# Patient Record
(demographics unavailable — no encounter records)

---

## 2025-01-10 NOTE — PHYSICAL EXAM
[No Acute Distress] : no acute distress [Well Nourished] : well nourished [Normal Sclera/Conjunctiva] : normal sclera/conjunctiva [Normal Outer Ear/Nose] : the outer ears and nose were normal in appearance [Normal Oropharynx] : the oropharynx was normal [No JVD] : no jugular venous distention [Supple] : supple [No Respiratory Distress] : no respiratory distress  [No Accessory Muscle Use] : no accessory muscle use [Normal Rate] : normal rate  [Regular Rhythm] : with a regular rhythm [Normal S1, S2] : normal S1 and S2 [No Varicosities] : no varicosities [No Edema] : there was no peripheral edema [Soft] : abdomen soft [Non Tender] : non-tender [No HSM] : no HSM [Normal Supraclavicular Nodes] : no supraclavicular lymphadenopathy [Normal Anterior Cervical Nodes] : no anterior cervical lymphadenopathy [No CVA Tenderness] : no CVA  tenderness [No Joint Swelling] : no joint swelling [No Rash] : no rash [Speech Grossly Normal] : speech grossly normal [Normal Mood] : the mood was normal [Normal Insight/Judgement] : insight and judgment were intact

## 2025-01-10 NOTE — HISTORY OF PRESENT ILLNESS
[FreeTextEntry1] : Follow up annual visit [de-identified] : 62Y/M with no significant PMH, presented for annual follow up. Denied active complaints. Requesting pre-employment physical.  Patient has been consulted about the negative effects of smoking and advised to quit. Was Offered nicotine patches but refused.

## 2025-01-10 NOTE — ASSESSMENT
[FreeTextEntry1] : 62Y/M with no significant PMH, presented for annual follow up. Denied active complaints. Requesting pre-employment physical.   #Pre-employment physical - Denied any active complaints - No blood work >>new blood work orders placed today  - Quantiferon plus (2022): negative , will repeat   # Smoker - Pack years: 17 pack years (1/2 pack daily for 33 years), also smokes marijuana daily - counselled about smoking cessation>>patient doesnot wants to talk about quitting smoking cigarettes/marijuana - refused lung cancer screening   #HCM - Colon cancer screening: Pt declines colonoscopy - Lung cancer screening: Patient Refused> doesnot wants to talk about it - declines pneumonia and flu vaccine  RTC in 1 year/prn

## 2025-01-10 NOTE — REVIEW OF SYSTEMS
[Fever] : no fever [Night Sweats] : no night sweats [Discharge] : no discharge [Vision Problems] : no vision problems [Earache] : no earache [Nasal Discharge] : no nasal discharge [Chest Pain] : no chest pain [Palpitations] : no palpitations [Shortness Of Breath] : no shortness of breath [Wheezing] : no wheezing [Cough] : no cough [Dyspnea on Exertion] : not dyspnea on exertion [Abdominal Pain] : no abdominal pain [Constipation] : no constipation [Vomiting] : no vomiting [Heartburn] : no heartburn [Dysuria] : no dysuria [Hematuria] : no hematuria [Frequency] : no frequency [Joint Pain] : no joint pain [Back Pain] : no back pain [Itching] : no itching [Skin Rash] : no skin rash [Dizziness] : no dizziness [Insomnia] : no insomnia [Easy Bruising] : no easy bruising